# Patient Record
Sex: MALE | Race: WHITE | NOT HISPANIC OR LATINO | URBAN - METROPOLITAN AREA
[De-identification: names, ages, dates, MRNs, and addresses within clinical notes are randomized per-mention and may not be internally consistent; named-entity substitution may affect disease eponyms.]

---

## 2018-10-02 PROBLEM — 38341003 HYPERTENSION: Status: ACTIVE | Noted: 2018-10-02

## 2018-10-02 PROBLEM — 13644009 HYPERCHOLESTEROLEMIA: Status: ACTIVE | Noted: 2018-10-02

## 2018-10-02 PROBLEM — 73430006 SLEEP APNEA: Status: ACTIVE | Noted: 2018-10-02

## 2018-10-26 PROBLEM — 49436004 ATRIAL FIBRILLATION: Status: ACTIVE | Noted: 2018-10-26

## 2018-10-26 PROBLEM — 428283002 HISTORY OF POLYP OF COLON: Status: ACTIVE | Noted: 2018-10-26

## 2018-10-26 PROBLEM — 161701005 HISTORY OF RESPIRATOR DEPENDENCE: Status: ACTIVE | Noted: 2018-10-26

## 2018-10-26 PROBLEM — 609558009 ESSENTIAL TREMOR: Status: ACTIVE | Noted: 2018-10-26

## 2018-10-26 PROBLEM — 95570007 KIDNEY STONE: Status: ACTIVE | Noted: 2018-10-26

## 2021-08-28 ENCOUNTER — TELEPHONE ENCOUNTER (OUTPATIENT)
Dept: URBAN - METROPOLITAN AREA CLINIC 13 | Facility: CLINIC | Age: 66
End: 2021-08-28

## 2021-08-28 RX ORDER — VENLAFAXINE HYDROCHLORIDE 75 MG/1
CAPSULE, EXTENDED RELEASE ORAL
OUTPATIENT
End: 2018-10-26

## 2021-08-29 ENCOUNTER — TELEPHONE ENCOUNTER (OUTPATIENT)
Dept: URBAN - METROPOLITAN AREA CLINIC 13 | Facility: CLINIC | Age: 66
End: 2021-08-29

## 2021-08-29 RX ORDER — TAMSULOSIN HYDROCHLORIDE 0.4 MG/1
CAPSULE ORAL
Status: ACTIVE | COMMUNITY

## 2021-08-29 RX ORDER — PROPAFENONE HYDROCHLORIDE 225 MG/1
TABLET, FILM COATED ORAL
Status: ACTIVE | COMMUNITY

## 2021-08-29 RX ORDER — ESOMEPRAZOLE MAGNESIUM 40 MG/1
FOR SUSPENSION ORAL
Status: ACTIVE | COMMUNITY

## 2021-08-29 RX ORDER — HYDROCHLOROTHIAZIDE 25 MG/1
TABLET ORAL
Status: ACTIVE | COMMUNITY

## 2021-08-29 RX ORDER — SERTRALINE 100 MG/1
TABLET, FILM COATED ORAL
Status: ACTIVE | COMMUNITY

## 2021-08-29 RX ORDER — TIZANIDINE 4 MG/1
TABLET ORAL
Status: ACTIVE | COMMUNITY

## 2021-08-29 RX ORDER — PROPRANOLOL HYDROCHLORIDE 80 MG/1
TABLET ORAL
Status: ACTIVE | COMMUNITY

## 2021-08-29 RX ORDER — APIXABAN 5 MG/1
TABLET, FILM COATED ORAL
Status: ACTIVE | COMMUNITY

## 2023-04-03 ENCOUNTER — LAB OUTSIDE AN ENCOUNTER (OUTPATIENT)
Dept: URBAN - METROPOLITAN AREA CLINIC 19 | Facility: CLINIC | Age: 68
End: 2023-04-03

## 2023-04-03 ENCOUNTER — WEB ENCOUNTER (OUTPATIENT)
Dept: URBAN - METROPOLITAN AREA CLINIC 19 | Facility: CLINIC | Age: 68
End: 2023-04-03

## 2023-04-03 ENCOUNTER — OFFICE VISIT (OUTPATIENT)
Dept: URBAN - METROPOLITAN AREA CLINIC 19 | Facility: CLINIC | Age: 68
End: 2023-04-03
Payer: MEDICARE

## 2023-04-03 VITALS
BODY MASS INDEX: 39.98 KG/M2 | WEIGHT: 295.2 LBS | DIASTOLIC BLOOD PRESSURE: 84 MMHG | HEIGHT: 72 IN | TEMPERATURE: 98.1 F | OXYGEN SATURATION: 94 % | HEART RATE: 71 BPM | SYSTOLIC BLOOD PRESSURE: 145 MMHG

## 2023-04-03 DIAGNOSIS — K92.1 BLACK STOOL: ICD-10-CM

## 2023-04-03 DIAGNOSIS — K59.04 CHRONIC IDIOPATHIC CONSTIPATION: ICD-10-CM

## 2023-04-03 DIAGNOSIS — R10.11 RUQ PAIN: ICD-10-CM

## 2023-04-03 DIAGNOSIS — D50.0 IRON DEFICIENCY ANEMIA DUE TO CHRONIC BLOOD LOSS: ICD-10-CM

## 2023-04-03 PROBLEM — 428283002: Status: ACTIVE | Noted: 2023-04-03

## 2023-04-03 PROBLEM — 724556004: Status: ACTIVE | Noted: 2023-04-03

## 2023-04-03 PROBLEM — 82934008: Status: ACTIVE | Noted: 2023-04-03

## 2023-04-03 PROCEDURE — 99245 OFF/OP CONSLTJ NEW/EST HI 55: CPT | Performed by: NURSE PRACTITIONER

## 2023-04-03 PROCEDURE — 99205 OFFICE O/P NEW HI 60 MIN: CPT | Performed by: NURSE PRACTITIONER

## 2023-04-03 RX ORDER — HYDROCHLOROTHIAZIDE 25 MG/1
TABLET ORAL
Status: ACTIVE | COMMUNITY

## 2023-04-03 RX ORDER — TIZANIDINE 4 MG/1
TABLET ORAL
Status: ACTIVE | COMMUNITY

## 2023-04-03 RX ORDER — TAMSULOSIN HYDROCHLORIDE 0.4 MG/1
CAPSULE ORAL
Status: ACTIVE | COMMUNITY

## 2023-04-03 RX ORDER — LAMOTRIGINE 200 MG/1
1 TABLET TABLET ORAL
Status: ACTIVE | COMMUNITY

## 2023-04-03 RX ORDER — SERTRALINE 100 MG/1
TABLET, FILM COATED ORAL
Status: ACTIVE | COMMUNITY

## 2023-04-03 RX ORDER — PROPRANOLOL HYDROCHLORIDE 80 MG/1
TABLET ORAL
Status: ACTIVE | COMMUNITY

## 2023-04-03 RX ORDER — ESOMEPRAZOLE MAGNESIUM 40 MG/1
FOR SUSPENSION ORAL
Status: ACTIVE | COMMUNITY

## 2023-04-03 RX ORDER — PROPAFENONE HYDROCHLORIDE 225 MG/1
TABLET, FILM COATED ORAL
Status: ACTIVE | COMMUNITY

## 2023-04-03 RX ORDER — BUPROPION HYDROCHLORIDE 200 MG/1
1 TABLET IN THE MORNING TABLET, FILM COATED, EXTENDED RELEASE ORAL
Status: ACTIVE | COMMUNITY

## 2023-04-03 RX ORDER — APIXABAN 5 MG/1
TABLET, FILM COATED ORAL
Status: ACTIVE | COMMUNITY

## 2023-04-03 RX ORDER — ROPINIROLE HYDROCHLORIDE 0.5 MG/1
1 TABLET 1 TO 3 HOURS BEFORE BEDTIME TABLET, FILM COATED ORAL
Status: ACTIVE | COMMUNITY

## 2023-04-03 NOTE — HPI-TODAY'S VISIT:
Mr. Zhou is a 67-year-old male with PMH of HTN, A-fib on Eliquis, COPD, chronic gout, GERD who presents today for CIC and complaints of black stools. Sent upon referral from Dr. Sae Bond. A copy of this report will be sent to the referring provider.  Last colonoscopy was done at [facility] in [facility] on 10/26/2018.  A single sessile 5 mm polyp of benign appearance was found in the sigmoid colon. Two sessile polyps of benign appearance ranging in size from 6 mm to 7 mm were found in the descending colon. Path from biopsies showed tubular adenomas. Recommended repeat colon in 3 years.  He denies having any family history of colon cancer. Both of his sisters have skin cancer. His Father passed from some type of cancer but he does not know what type but knows it was not colon cancer.  He reports ongoing for 2 weeks and everytime he had a BM. Hemoccult done with his PCP was positive. He states over the past week it has lightened up. Reports constipation ever since starting a keto diet 8/2022. He stopped it about a month ago and his weight has gone back up. Still with constipation but he states not as difficult to have a BM. He has tried Metamucil and suppositories with no relief Now having one a day or one every 2-3 days. Reports having a lot of straining, and sitting on toilet for long periods of time.   Reports sometimes at night when laying down and taking a deep breath, he experiences some RUQ pain. Not associated with eating. He reports he has some lesion located between his pancreas and small intestine. He was being seen at [place], was told he may need a Whipple procedure but the lesion did not increase, he was told to return in 2 years but he states he never went back and this was about 5 years ago.  He states he had recent labs done with his PCP and was told he was anemic. He is on a blood thinner. He takes Tylenol twice a day for general aches/pains. He denies taking any NSAIDs. He denies any dysphagia, reflux, nausea or vomiting.

## 2023-04-04 LAB
A/G RATIO: 1.4
ALBUMIN: 3.9
ALKALINE PHOSPHATASE: 101
ALT (SGPT): 9
AST (SGOT): 12
BILIRUBIN, TOTAL: 0.4
BUN/CREATININE RATIO: (no result)
BUN: 18
CALCIUM: 9
CARBON DIOXIDE, TOTAL: 30
CHLORIDE: 102
CREATININE: 1.15
EGFR: 70
FERRITIN, SERUM: 8
GLOBULIN, TOTAL: 2.7
GLUCOSE: 108
HEMATOCRIT: 28
HEMOGLOBIN: 8.8
IRON BIND.CAP.(TIBC): 404
IRON SATURATION: 7
IRON: 29
LIPASE: 26
MCH: 26.4
MCHC: 31.4
MCV: 84.1
MPV: 10.8
PLATELET COUNT: 226
POTASSIUM: 4.1
PROTEIN, TOTAL: 6.6
RDW: 13.4
RED BLOOD CELL COUNT: 3.33
SODIUM: 139
WHITE BLOOD CELL COUNT: 6.2

## 2023-04-05 ENCOUNTER — TELEPHONE ENCOUNTER (OUTPATIENT)
Dept: URBAN - METROPOLITAN AREA CLINIC 35 | Facility: CLINIC | Age: 68
End: 2023-04-05

## 2023-04-12 ENCOUNTER — OFFICE VISIT (OUTPATIENT)
Dept: URBAN - METROPOLITAN AREA CLINIC 18 | Facility: CLINIC | Age: 68
End: 2023-04-12
Payer: MEDICARE

## 2023-04-12 ENCOUNTER — TELEPHONE ENCOUNTER (OUTPATIENT)
Dept: URBAN - METROPOLITAN AREA CLINIC 19 | Facility: CLINIC | Age: 68
End: 2023-04-12

## 2023-04-12 DIAGNOSIS — K76.0 FATTY (CHANGE OF) LIVER: ICD-10-CM

## 2023-04-12 PROCEDURE — 76705 ECHO EXAM OF ABDOMEN: CPT | Performed by: INTERNAL MEDICINE

## 2023-04-12 RX ORDER — PROPAFENONE HYDROCHLORIDE 225 MG/1
TABLET, FILM COATED ORAL
Status: ACTIVE | COMMUNITY

## 2023-04-12 RX ORDER — PROPRANOLOL HYDROCHLORIDE 80 MG/1
TABLET ORAL
Status: ACTIVE | COMMUNITY

## 2023-04-12 RX ORDER — TAMSULOSIN HYDROCHLORIDE 0.4 MG/1
CAPSULE ORAL
Status: ACTIVE | COMMUNITY

## 2023-04-12 RX ORDER — ROPINIROLE HYDROCHLORIDE 0.5 MG/1
1 TABLET 1 TO 3 HOURS BEFORE BEDTIME TABLET, FILM COATED ORAL
Status: ACTIVE | COMMUNITY

## 2023-04-12 RX ORDER — HYDROCHLOROTHIAZIDE 25 MG/1
TABLET ORAL
Status: ACTIVE | COMMUNITY

## 2023-04-12 RX ORDER — ESOMEPRAZOLE MAGNESIUM 40 MG/1
FOR SUSPENSION ORAL
Status: ACTIVE | COMMUNITY

## 2023-04-12 RX ORDER — APIXABAN 5 MG/1
TABLET, FILM COATED ORAL
Status: ACTIVE | COMMUNITY

## 2023-04-12 RX ORDER — TIZANIDINE 4 MG/1
TABLET ORAL
Status: ACTIVE | COMMUNITY

## 2023-04-12 RX ORDER — BUPROPION HYDROCHLORIDE 200 MG/1
1 TABLET IN THE MORNING TABLET, FILM COATED, EXTENDED RELEASE ORAL
Status: ACTIVE | COMMUNITY

## 2023-04-12 RX ORDER — SERTRALINE 100 MG/1
TABLET, FILM COATED ORAL
Status: ACTIVE | COMMUNITY

## 2023-04-12 RX ORDER — LAMOTRIGINE 200 MG/1
1 TABLET TABLET ORAL
Status: ACTIVE | COMMUNITY

## 2023-04-26 ENCOUNTER — TELEPHONE ENCOUNTER (OUTPATIENT)
Dept: URBAN - METROPOLITAN AREA CLINIC 19 | Facility: CLINIC | Age: 68
End: 2023-04-26

## 2023-05-05 ENCOUNTER — TELEPHONE ENCOUNTER (OUTPATIENT)
Dept: URBAN - METROPOLITAN AREA CLINIC 19 | Facility: CLINIC | Age: 68
End: 2023-05-05

## 2023-05-08 ENCOUNTER — OFFICE VISIT (OUTPATIENT)
Dept: URBAN - METROPOLITAN AREA MEDICAL CENTER 25 | Facility: MEDICAL CENTER | Age: 68
End: 2023-05-08
Payer: MEDICARE

## 2023-05-08 DIAGNOSIS — D50.0 1. ANEMIA, IRON DEFICIENCY FROM CHRONIC BLOOD LOSS:: ICD-10-CM

## 2023-05-08 DIAGNOSIS — K31.89 ACQUIRED DEFORMITY OF DUODENUM: ICD-10-CM

## 2023-05-08 DIAGNOSIS — K31.7 BENIGN GASTRIC POLYP: ICD-10-CM

## 2023-05-08 PROCEDURE — 43236 UPPR GI SCOPE W/SUBMUC INJ: CPT | Performed by: INTERNAL MEDICINE

## 2023-05-08 PROCEDURE — 43251 EGD REMOVE LESION SNARE: CPT | Performed by: INTERNAL MEDICINE

## 2023-05-08 PROCEDURE — 43239 EGD BIOPSY SINGLE/MULTIPLE: CPT | Performed by: INTERNAL MEDICINE

## 2023-05-15 ENCOUNTER — TELEPHONE ENCOUNTER (OUTPATIENT)
Dept: URBAN - METROPOLITAN AREA CLINIC 60 | Facility: CLINIC | Age: 68
End: 2023-05-15

## 2023-05-18 ENCOUNTER — TELEPHONE ENCOUNTER (OUTPATIENT)
Dept: URBAN - METROPOLITAN AREA CLINIC 19 | Facility: CLINIC | Age: 68
End: 2023-05-18

## 2023-05-18 RX ORDER — FERROUS SULFATE 325(65) MG
1 TABLET TABLET ORAL ONCE A DAY
Qty: 30 TABLET | Refills: 3 | OUTPATIENT
Start: 2023-05-18

## 2023-05-18 RX ORDER — TAMSULOSIN HYDROCHLORIDE 0.4 MG/1
CAPSULE ORAL
COMMUNITY

## 2023-05-18 RX ORDER — ROPINIROLE HYDROCHLORIDE 0.5 MG/1
1 TABLET 1 TO 3 HOURS BEFORE BEDTIME TABLET, FILM COATED ORAL
COMMUNITY

## 2023-05-18 RX ORDER — PROPRANOLOL HYDROCHLORIDE 80 MG/1
TABLET ORAL
COMMUNITY

## 2023-05-18 RX ORDER — ESOMEPRAZOLE MAGNESIUM 40 MG/1
FOR SUSPENSION ORAL
COMMUNITY

## 2023-05-18 RX ORDER — LAMOTRIGINE 200 MG/1
1 TABLET TABLET ORAL
COMMUNITY

## 2023-05-18 RX ORDER — TIZANIDINE 4 MG/1
TABLET ORAL
COMMUNITY

## 2023-05-18 RX ORDER — SERTRALINE 100 MG/1
TABLET, FILM COATED ORAL
COMMUNITY

## 2023-05-18 RX ORDER — HYDROCHLOROTHIAZIDE 25 MG/1
TABLET ORAL
COMMUNITY

## 2023-05-18 RX ORDER — APIXABAN 5 MG/1
TABLET, FILM COATED ORAL
COMMUNITY

## 2023-05-18 RX ORDER — PROPAFENONE HYDROCHLORIDE 225 MG/1
TABLET, FILM COATED ORAL
COMMUNITY

## 2023-05-18 RX ORDER — BUPROPION HYDROCHLORIDE 200 MG/1
1 TABLET IN THE MORNING TABLET, FILM COATED, EXTENDED RELEASE ORAL
COMMUNITY

## 2023-06-19 ENCOUNTER — TELEPHONE ENCOUNTER (OUTPATIENT)
Dept: URBAN - METROPOLITAN AREA CLINIC 19 | Facility: CLINIC | Age: 68
End: 2023-06-19

## 2023-06-19 ENCOUNTER — OFFICE VISIT (OUTPATIENT)
Dept: URBAN - METROPOLITAN AREA CLINIC 19 | Facility: CLINIC | Age: 68
End: 2023-06-19
Payer: MEDICARE

## 2023-06-19 VITALS
HEART RATE: 99 BPM | HEIGHT: 72 IN | OXYGEN SATURATION: 99 % | SYSTOLIC BLOOD PRESSURE: 112 MMHG | DIASTOLIC BLOOD PRESSURE: 64 MMHG | BODY MASS INDEX: 40.69 KG/M2 | TEMPERATURE: 97.9 F | WEIGHT: 300.4 LBS

## 2023-06-19 DIAGNOSIS — D50.0 IRON DEFICIENCY ANEMIA DUE TO CHRONIC BLOOD LOSS: ICD-10-CM

## 2023-06-19 DIAGNOSIS — K76.0 HEPATIC STEATOSIS: ICD-10-CM

## 2023-06-19 PROCEDURE — 99213 OFFICE O/P EST LOW 20 MIN: CPT | Performed by: NURSE PRACTITIONER

## 2023-06-19 RX ORDER — OMEPRAZOLE 40 MG/1
1 CAPSULE 30 MINUTES BEFORE MORNING MEAL CAPSULE, DELAYED RELEASE ORAL
Status: ACTIVE | COMMUNITY

## 2023-06-19 RX ORDER — CLONAZEPAM 0.5 MG/1
1 TABLET TABLET ORAL
Status: ACTIVE | COMMUNITY

## 2023-06-19 RX ORDER — LAMOTRIGINE 200 MG/1
1 TABLET TABLET ORAL
Status: ACTIVE | COMMUNITY

## 2023-06-19 RX ORDER — TAMSULOSIN HYDROCHLORIDE 0.4 MG/1
CAPSULE ORAL
Status: ACTIVE | COMMUNITY

## 2023-06-19 RX ORDER — RIVAROXABAN 20 MG/1
1 TABLET WITH FOOD TABLET, FILM COATED ORAL ONCE A DAY
Status: ACTIVE | COMMUNITY

## 2023-06-19 RX ORDER — ESOMEPRAZOLE MAGNESIUM 40 MG/1
FOR SUSPENSION ORAL
Status: ACTIVE | COMMUNITY

## 2023-06-19 RX ORDER — PROPAFENONE HYDROCHLORIDE 225 MG/1
TABLET, FILM COATED ORAL
Status: ACTIVE | COMMUNITY

## 2023-06-19 RX ORDER — FERROUS SULFATE 325(65) MG
1 TABLET TABLET ORAL ONCE A DAY
Qty: 30 TABLET | Refills: 3 | Status: ACTIVE | COMMUNITY
Start: 2023-05-18

## 2023-06-19 RX ORDER — PROPRANOLOL HYDROCHLORIDE 80 MG/1
TABLET ORAL
Status: ACTIVE | COMMUNITY

## 2023-06-19 NOTE — HPI-TODAY'S VISIT:
Mr. Zhou is a 67 yo male PMH of HTN, A-fib on Eliquis, COPD, chronic gout, GERD who presents today for follow up. Last seen in clinic by me on 4/3/2023 for chronic constipation and black stools.   EGD done by Dr. Gaemz on 5/8/2023.  3 cm hiatal hernia.  Z-line irregular.  A single gastric polyp with no bleeding or stigmata of bleeding was found in the gastric fundus.  A moderate bulging deformity was found in the ampulla.  Duodenal deformity. Biopsies were taken.    Plan to repeat EGD in 6 months for surveillance.  No GE junction biopsies done at this time will need to be done during next visit.  Path: Ampulla biopsy-benign small intestinal type mucosa showing no significant histopathology.  Gastric fundus polyp-consistent with benign hyperplastic polyp.  Was unable to follow through with colonoscopy as EGD procdure took longer than expected. Plan to do colonoscopy at later date.  Started on oral iron and he is compliant, taking once/day. Tolerating well. Stools are not as black as they used to be. Having a firm BM sometimes daily and sometimes every other day. Still having constipation but states it is somewhat better.            Last colonoscopy was done at [facility] in [facility] on 10/26/2018. A single sessile 5 mm polyp of benign appearance was found in the sigmoid colon. Two sessile polyps of benign appearance ranging in size from 6 mm to 7 mm were found in the descending colon. Path from biopsies showed tubular adenomas. Recommended repeat colon in 3 years.        He denies having any family history of colon cancer. Both of his sisters have skin cancer. His Father passed from some type of cancer but he does not know what type but knows it was not colon cancer.        He reports ongoing for 2 weeks and everytime he had a BM. Hemoccult done with his PCP was positive. He states over the past week it has lightened up. Reports constipation ever since starting a keto diet 8/2022. He stopped it about a month ago and his weight has gone back up. Still with constipation but he states not as difficult to have a BM. He has tried Metamucil and suppositories with no relief        Now having one a day or one every 2-3 days. Reports having a lot of straining, and sitting on toilet for long periods of time.        Reports sometimes at night when laying down and taking a deep breath, he experiences some RUQ pain. Not associated with eating. He reports he has some lesion located between his pancreas and small intestine. He was being seen at [place], was told he may need a Whipple procedure but the lesion did not increase, he was told to return in 2 years but he states he never went back and this was about 5 years ago.        He states he had recent labs done with his PCP and was told he was anemic. He is on a blood thinner. He takes Tylenol twice a day for general aches/pains. He denies taking any NSAIDs. He denies any dysphagia, reflux, nausea or vomiting..

## 2023-06-19 NOTE — PHYSICAL EXAM EYES:
Conjunctivae and eyelids appear normal,  Sclerae : White without injection  alert and awake/follows commands

## 2023-06-20 LAB
FERRITIN, SERUM: 27
HEMATOCRIT: 40
HEMOGLOBIN: 12.7
IRON BIND.CAP.(TIBC): 383
IRON SATURATION: 12
IRON: 46
MCH: 27.1
MCHC: 31.8
MCV: 85.5
MPV: 11.6
PLATELET COUNT: 201
RDW: 18.5
RED BLOOD CELL COUNT: 4.68
WHITE BLOOD CELL COUNT: 6.5

## 2023-06-22 PROBLEM — 197321007: Status: ACTIVE | Noted: 2023-06-22

## 2023-06-29 ENCOUNTER — OFFICE VISIT (OUTPATIENT)
Dept: URBAN - METROPOLITAN AREA CLINIC 19 | Facility: CLINIC | Age: 68
End: 2023-06-29

## 2023-09-21 ENCOUNTER — TELEPHONE ENCOUNTER (OUTPATIENT)
Dept: URBAN - METROPOLITAN AREA CLINIC 19 | Facility: CLINIC | Age: 68
End: 2023-09-21

## 2023-09-21 RX ORDER — FERROUS SULFATE 325(65) MG
1 TABLET TABLET ORAL ONCE A DAY
Qty: 30 TABLET | Refills: 3
Start: 2023-05-18

## 2023-09-24 ENCOUNTER — ERX REFILL RESPONSE (OUTPATIENT)
Dept: URBAN - METROPOLITAN AREA CLINIC 19 | Facility: CLINIC | Age: 68
End: 2023-09-24

## 2023-09-24 RX ORDER — FERROUS SULFATE 325(65) MG
1 TABLET TABLET ORAL ONCE A DAY
Qty: 30 TABLET | Refills: 3 | OUTPATIENT

## 2023-09-24 RX ORDER — FERROUS SULFATE TAB 325 MG (65 MG ELEMENTAL FE) 325 (65 FE) MG
TAKE ONE TABLET BY MOUTH ONE TIME DAILY TAB ORAL
Qty: 30 TABLET | Refills: 2 | OUTPATIENT

## 2023-10-04 ENCOUNTER — OFFICE VISIT (OUTPATIENT)
Dept: URBAN - METROPOLITAN AREA CLINIC 19 | Facility: CLINIC | Age: 68
End: 2023-10-04
Payer: MEDICARE

## 2023-10-04 ENCOUNTER — LAB OUTSIDE AN ENCOUNTER (OUTPATIENT)
Dept: URBAN - METROPOLITAN AREA CLINIC 19 | Facility: CLINIC | Age: 68
End: 2023-10-04

## 2023-10-04 VITALS
TEMPERATURE: 97.2 F | SYSTOLIC BLOOD PRESSURE: 124 MMHG | HEART RATE: 77 BPM | OXYGEN SATURATION: 98 % | BODY MASS INDEX: 42.66 KG/M2 | DIASTOLIC BLOOD PRESSURE: 72 MMHG | WEIGHT: 315 LBS | HEIGHT: 72 IN

## 2023-10-04 DIAGNOSIS — K92.1 BLACK STOOL: ICD-10-CM

## 2023-10-04 DIAGNOSIS — D50.0 IRON DEFICIENCY ANEMIA DUE TO CHRONIC BLOOD LOSS: ICD-10-CM

## 2023-10-04 DIAGNOSIS — Z86.010 PERSONAL HISTORY OF COLONIC POLYPS: ICD-10-CM

## 2023-10-04 PROCEDURE — 99214 OFFICE O/P EST MOD 30 MIN: CPT | Performed by: NURSE PRACTITIONER

## 2023-10-04 RX ORDER — CLONAZEPAM 0.5 MG/1
1 TABLET TABLET ORAL
Status: ACTIVE | COMMUNITY

## 2023-10-04 RX ORDER — ESOMEPRAZOLE MAGNESIUM 40 MG/1
FOR SUSPENSION ORAL
Status: ACTIVE | COMMUNITY

## 2023-10-04 RX ORDER — RIVAROXABAN 20 MG/1
1 TABLET WITH FOOD TABLET, FILM COATED ORAL ONCE A DAY
Status: ACTIVE | COMMUNITY

## 2023-10-04 RX ORDER — OMEPRAZOLE 40 MG/1
1 CAPSULE 30 MINUTES BEFORE MORNING MEAL CAPSULE, DELAYED RELEASE ORAL
Status: ACTIVE | COMMUNITY

## 2023-10-04 RX ORDER — PROPAFENONE HYDROCHLORIDE 225 MG/1
TABLET, FILM COATED ORAL
Status: ACTIVE | COMMUNITY

## 2023-10-04 RX ORDER — FERROUS SULFATE TAB 325 MG (65 MG ELEMENTAL FE) 325 (65 FE) MG
TAKE ONE TABLET BY MOUTH ONE TIME DAILY TAB ORAL
Qty: 30 TABLET | Refills: 2 | Status: ACTIVE | COMMUNITY

## 2023-10-04 RX ORDER — TAMSULOSIN HYDROCHLORIDE 0.4 MG/1
CAPSULE ORAL
Status: ACTIVE | COMMUNITY

## 2023-10-04 RX ORDER — PROPRANOLOL HYDROCHLORIDE 80 MG/1
TABLET ORAL
Status: ACTIVE | COMMUNITY

## 2023-10-04 RX ORDER — LAMOTRIGINE 200 MG/1
1 TABLET TABLET ORAL
Status: ACTIVE | COMMUNITY

## 2023-10-05 LAB
FERRITIN, SERUM: 53
HEMATOCRIT: 42.7
HEMOGLOBIN: 14
IRON BIND.CAP.(TIBC): 353
IRON SATURATION: 38
IRON: 134
MCH: 30.4
MCHC: 32.8
MCV: 92.6
MPV: 11.6
PLATELET COUNT: 140
RDW: 13.3
RED BLOOD CELL COUNT: 4.61
WHITE BLOOD CELL COUNT: 6.4

## 2023-10-12 ENCOUNTER — TELEPHONE ENCOUNTER (OUTPATIENT)
Dept: URBAN - METROPOLITAN AREA CLINIC 19 | Facility: CLINIC | Age: 68
End: 2023-10-12

## 2023-10-18 ENCOUNTER — TELEPHONE ENCOUNTER (OUTPATIENT)
Dept: URBAN - METROPOLITAN AREA CLINIC 44 | Facility: CLINIC | Age: 68
End: 2023-10-18

## 2023-11-02 ENCOUNTER — OFFICE VISIT (OUTPATIENT)
Dept: URBAN - METROPOLITAN AREA MEDICAL CENTER 25 | Facility: MEDICAL CENTER | Age: 68
End: 2023-11-02
Payer: MEDICARE

## 2023-11-02 DIAGNOSIS — K22.89 DILATATION OF ESOPHAGUS: ICD-10-CM

## 2023-11-02 DIAGNOSIS — K29.60 ADENOPAPILLOMATOSIS GASTRICA: ICD-10-CM

## 2023-11-02 DIAGNOSIS — D12.4 ADENOMA OF DESCENDING COLON: ICD-10-CM

## 2023-11-02 DIAGNOSIS — D12.2 ADENOMA OF ASCENDING COLON: ICD-10-CM

## 2023-11-02 DIAGNOSIS — K62.1 ANAL AND RECTAL POLYP: ICD-10-CM

## 2023-11-02 DIAGNOSIS — D12.3 ADENOMA OF TRANSVERSE COLON: ICD-10-CM

## 2023-11-02 DIAGNOSIS — D50.9 ANEMIA: ICD-10-CM

## 2023-11-02 PROCEDURE — 43239 EGD BIOPSY SINGLE/MULTIPLE: CPT | Performed by: INTERNAL MEDICINE

## 2023-11-02 PROCEDURE — 45380 COLONOSCOPY AND BIOPSY: CPT | Performed by: INTERNAL MEDICINE

## 2024-01-19 ENCOUNTER — DASHBOARD ENCOUNTERS (OUTPATIENT)
Age: 69
End: 2024-01-19

## 2024-01-19 ENCOUNTER — LAB OUTSIDE AN ENCOUNTER (OUTPATIENT)
Dept: URBAN - METROPOLITAN AREA CLINIC 19 | Facility: CLINIC | Age: 69
End: 2024-01-19

## 2024-01-19 ENCOUNTER — OFFICE VISIT (OUTPATIENT)
Dept: URBAN - METROPOLITAN AREA CLINIC 19 | Facility: CLINIC | Age: 69
End: 2024-01-19
Payer: MEDICARE

## 2024-01-19 VITALS
WEIGHT: 315 LBS | SYSTOLIC BLOOD PRESSURE: 130 MMHG | BODY MASS INDEX: 42.66 KG/M2 | HEART RATE: 91 BPM | HEIGHT: 72 IN | TEMPERATURE: 97 F | DIASTOLIC BLOOD PRESSURE: 82 MMHG

## 2024-01-19 DIAGNOSIS — D50.8 ACHLORHYDRIC ANEMIA: ICD-10-CM

## 2024-01-19 PROCEDURE — 99214 OFFICE O/P EST MOD 30 MIN: CPT | Performed by: NURSE PRACTITIONER

## 2024-01-19 RX ORDER — CLONAZEPAM 0.5 MG/1
1 TABLET TABLET ORAL
Status: ACTIVE | COMMUNITY

## 2024-01-19 RX ORDER — RIVAROXABAN 20 MG/1
1 TABLET WITH FOOD TABLET, FILM COATED ORAL ONCE A DAY
Status: ACTIVE | COMMUNITY

## 2024-01-19 RX ORDER — ESOMEPRAZOLE MAGNESIUM 40 MG/1
FOR SUSPENSION ORAL
Status: ACTIVE | COMMUNITY

## 2024-01-19 RX ORDER — PROPAFENONE HYDROCHLORIDE 225 MG/1
TABLET, FILM COATED ORAL
Status: ACTIVE | COMMUNITY

## 2024-01-19 RX ORDER — PROPRANOLOL HYDROCHLORIDE 80 MG/1
TABLET ORAL
Status: ACTIVE | COMMUNITY

## 2024-01-19 RX ORDER — TAMSULOSIN HYDROCHLORIDE 0.4 MG/1
CAPSULE ORAL
Status: ACTIVE | COMMUNITY

## 2024-01-19 RX ORDER — LAMOTRIGINE 200 MG/1
1 TABLET TABLET ORAL
Status: ACTIVE | COMMUNITY

## 2024-01-19 RX ORDER — OMEPRAZOLE 40 MG/1
1 CAPSULE 30 MINUTES BEFORE MORNING MEAL CAPSULE, DELAYED RELEASE ORAL
Status: ACTIVE | COMMUNITY

## 2024-01-19 RX ORDER — FERROUS SULFATE TAB 325 MG (65 MG ELEMENTAL FE) 325 (65 FE) MG
TAKE ONE TABLET BY MOUTH ONE TIME DAILY TAB ORAL
Qty: 30 TABLET | Refills: 2 | Status: ACTIVE | COMMUNITY

## 2024-01-19 NOTE — PHYSICAL EXAM HENT:
Head, normocephalic, atraumatic, Face, Face within normal limits, Ears, External ears within normal limits, Nose/Nasopharynx, External nose normal appearance, nares patent, no nasal discharge, Mouth and Throat, Oral cavity appearance normal, Lips, Appearance normal Post-Care Instructions: I reviewed with the patient in detail post-care instructions. Patient is to keep the biopsy site dry overnight, and then apply vasoline twice daily until healed. Patient may apply diluted hydrogen peroxide soaks to remove any crusting.

## 2024-01-19 NOTE — HPI-TODAY'S VISIT:
Mr. Zhou is a 69 yo male PMH of HTN, A-fib on Xarelto, COPD, essential tremors with deep brain stimulator, chronic gout, hepatic steatosis, GERD who presents today for follow up of JOVANNY, black stools.  Labs 4/3/2023:Hgb 8.8, iron 29, 7% iron sat, , ferritin 8  Labs 6/19/2023: Hg of 12.7, iron 46, 12% iron sat, , ferritin 27  Last seen in GI clinic 10/4/2023 Labs at that time: CBC normal with Hg of 14, iron studies normal, ferritin was normal at 53 Iron 134, 38% iron sat, , ferritin 53.  EGD/colonoscopy was performed by Dr. Gamez on 11/2/2023 EGD-Z-line irregular, chronic erosive gastritis, a single lesion was found in the duodenum. Path: Ampulla biopsy-focal foveolar metaplasia no specific histopathologic abnormality.  Random gastric biopsy-chronic inactive gastritis with focal regenerative type changes negative for H. pylori or intestinal metaplasia.  GE junction biopsy inflammation Colonoscopy-TI normal, 4 to 6 mm polyps four (tubular adenomas).  3-year follow-up given.   He reports being newly diagnosed with kidney cancer, plans for possible surgery in May Continues on ferrous sulfate daily - wants to know if he should continue or not.  Occasional black stools, denies blood in stool.              -------------------------        Prior Hx:        Seen in clinic by me on 4/3/2023 for chronic constipation and black stools.         - EGD done by Dr. Gamez on 5/8/2023. 3 cm hiatal hernia. Z-line irregular. A single gastric polyp with no bleeding or stigmata of bleeding was found in the gastric fundus. A moderate bulging deformity was found in the ampulla. Duodenal deformity. Biopsies were taken.         - Path: Ampulla biopsy-benign small intestinal type mucosa showing no significant histopathology. Gastric fundus polyp-consistent with benign hyperplastic polyp.         - Was unable to follow through with colonoscopy as EGD procdure took longer than expected. Plan to do colonoscopy at later date.         - Plan to repeat EGD in 6 months for surveillance. No GE junction biopsies done at this time will need to be done during next visit.         - Started on oral iron and he is compliant, taking once/day. Tolerating well. Stools are not as black as they used to be. Having a solid BM daily.         - Last colonoscopy was done at [facility] in [facility] on 10/26/2018. A single sessile 5 mm polyp of benign appearance was found in the sigmoid colon. Two sessile polyps of benign appearance ranging in size from 6 mm to 7 mm were found in the descending colon. Path from biopsies showed tubular adenomas. Recommended repeat colon in 3 years.         - He denies having any family history of colon cancer. Both of his sisters have skin cancer. His Father passed from some type of cancer but he does not know what type but knows it was not colon cancer.         - Reported occasionally when laying down at night and taking a deep breath, he experiences some RUQ pain. Not associated with eating. He reports he has some lesion located between his pancreas and small intestine. He was being seen at [place], was told he may need a Whipple procedure but the lesion did not increase, he was told to return in 2 years but he states he never went back and this was about 5 years ago..

## 2024-01-20 LAB
FERRITIN, SERUM: 36
FOLATE (FOLIC ACID), SERUM: 12.9
HEMATOCRIT: 40.8
HEMOGLOBIN: 13.4
IRON BIND.CAP.(TIBC): 311
IRON SATURATION: 28
IRON: 88
MCH: 30.4
MCHC: 32.8
MCV: 92.5
MPV: 11.7
PLATELET COUNT: 136
RDW: 12.3
RED BLOOD CELL COUNT: 4.41
VITAMIN B12: 464
WHITE BLOOD CELL COUNT: 5.8

## 2024-02-02 ENCOUNTER — PILLCAM (OUTPATIENT)
Dept: URBAN - METROPOLITAN AREA CLINIC 18 | Facility: CLINIC | Age: 69
End: 2024-02-02
Payer: MEDICARE

## 2024-02-02 DIAGNOSIS — D50.9 ANEMIA: ICD-10-CM

## 2024-02-02 PROCEDURE — 91110 GI TRC IMG INTRAL ESOPH-ILE: CPT | Performed by: INTERNAL MEDICINE

## 2024-02-02 RX ORDER — CLONAZEPAM 0.5 MG/1
1 TABLET TABLET ORAL
Status: ACTIVE | COMMUNITY

## 2024-02-02 RX ORDER — RIVAROXABAN 20 MG/1
1 TABLET WITH FOOD TABLET, FILM COATED ORAL ONCE A DAY
Status: ACTIVE | COMMUNITY

## 2024-02-02 RX ORDER — PROPAFENONE HYDROCHLORIDE 225 MG/1
TABLET, FILM COATED ORAL
Status: ACTIVE | COMMUNITY

## 2024-02-02 RX ORDER — TAMSULOSIN HYDROCHLORIDE 0.4 MG/1
CAPSULE ORAL
Status: ACTIVE | COMMUNITY

## 2024-02-02 RX ORDER — ESOMEPRAZOLE MAGNESIUM 40 MG/1
FOR SUSPENSION ORAL
Status: ACTIVE | COMMUNITY

## 2024-02-02 RX ORDER — PROPRANOLOL HYDROCHLORIDE 80 MG/1
TABLET ORAL
Status: ACTIVE | COMMUNITY

## 2024-02-02 RX ORDER — FERROUS SULFATE TAB 325 MG (65 MG ELEMENTAL FE) 325 (65 FE) MG
TAKE ONE TABLET BY MOUTH ONE TIME DAILY TAB ORAL
Qty: 30 TABLET | Refills: 2 | Status: ACTIVE | COMMUNITY

## 2024-02-02 RX ORDER — LAMOTRIGINE 200 MG/1
1 TABLET TABLET ORAL
Status: ACTIVE | COMMUNITY

## 2024-02-02 RX ORDER — OMEPRAZOLE 40 MG/1
1 CAPSULE 30 MINUTES BEFORE MORNING MEAL CAPSULE, DELAYED RELEASE ORAL
Status: ACTIVE | COMMUNITY

## 2024-08-30 ENCOUNTER — OFFICE VISIT (OUTPATIENT)
Dept: URBAN - METROPOLITAN AREA CLINIC 19 | Facility: CLINIC | Age: 69
End: 2024-08-30
Payer: MEDICARE

## 2024-08-30 VITALS
HEART RATE: 76 BPM | TEMPERATURE: 98.6 F | SYSTOLIC BLOOD PRESSURE: 120 MMHG | BODY MASS INDEX: 42.66 KG/M2 | HEIGHT: 72 IN | WEIGHT: 315 LBS | DIASTOLIC BLOOD PRESSURE: 70 MMHG

## 2024-08-30 DIAGNOSIS — K59.04 CHRONIC IDIOPATHIC CONSTIPATION: ICD-10-CM

## 2024-08-30 DIAGNOSIS — D50.0 IRON DEFICIENCY ANEMIA DUE TO CHRONIC BLOOD LOSS: ICD-10-CM

## 2024-08-30 PROCEDURE — 99214 OFFICE O/P EST MOD 30 MIN: CPT | Performed by: NURSE PRACTITIONER

## 2024-08-30 RX ORDER — PROPRANOLOL HYDROCHLORIDE 80 MG/1
TABLET ORAL
Status: ACTIVE | COMMUNITY

## 2024-08-30 RX ORDER — RIVAROXABAN 20 MG/1
1 TABLET WITH FOOD TABLET, FILM COATED ORAL ONCE A DAY
Status: DISCONTINUED | COMMUNITY

## 2024-08-30 RX ORDER — LAMOTRIGINE 200 MG/1
1 TABLET TABLET ORAL
Status: ACTIVE | COMMUNITY

## 2024-08-30 RX ORDER — OMEPRAZOLE 40 MG/1
1 CAPSULE 30 MINUTES BEFORE MORNING MEAL CAPSULE, DELAYED RELEASE ORAL
Status: ACTIVE | COMMUNITY

## 2024-08-30 RX ORDER — CLONAZEPAM 0.5 MG/1
1 TABLET TABLET ORAL
Status: ACTIVE | COMMUNITY

## 2024-08-30 RX ORDER — FERROUS SULFATE TAB 325 MG (65 MG ELEMENTAL FE) 325 (65 FE) MG
TAKE ONE TABLET BY MOUTH ONE TIME DAILY TAB ORAL
Qty: 30 TABLET | Refills: 11 | Status: DISCONTINUED | COMMUNITY

## 2024-08-30 RX ORDER — PROPAFENONE HYDROCHLORIDE 225 MG/1
TABLET, FILM COATED ORAL
Status: DISCONTINUED | COMMUNITY

## 2024-08-30 RX ORDER — ESOMEPRAZOLE MAGNESIUM 40 MG/1
FOR SUSPENSION ORAL
Status: DISCONTINUED | COMMUNITY

## 2024-08-30 RX ORDER — TAMSULOSIN HYDROCHLORIDE 0.4 MG/1
CAPSULE ORAL
Status: ACTIVE | COMMUNITY

## 2024-08-30 NOTE — HPI-TODAY'S VISIT:
Mr. Zhou is a 68 yo male PMH of HTN, A-fib on Xarelto, COPD, essential tremors with deep brain stimulator, chronic gout, hepatic steatosis, GERD.  Originially presented with JOVANNY and black stools.  Labs 4/3/2023:Hgb 8.8, iron 29, 7% iron sat, , ferritin 8  Labs 6/19/2023: Hg of 12.7, iron 46, 12% iron sat, , ferritin 27  Last seen in GI clinic 10/4/2023 Labs at that time: CBC normal with Hg of 14, iron studies normal, ferritin was normal at 53 Iron 134, 38% iron sat, , ferritin 53.  EGD/colonoscopy was performed by Dr. Gamez on 11/2/2023 EGD-Z-line irregular, chronic erosive gastritis, a single lesion was found in the duodenum. Path: Ampulla biopsy-focal foveolar metaplasia no specific histopathologic abnormality. Random gastric biopsy-chronic inactive gastritis with focal regenerative type changes negative for H. pylori or intestinal metaplasia. GE junction biopsy inflammation Colonoscopy-TI normal, 4 to 6 mm polyps four (tubular adenomas). 3-year follow-up given.  He was last seen 1/19/2020 for an labs that day WBC 5.8, Hgb 13.4, MCV 92.5,  vitamin B12 and folate were normal, iron studies were normal with ferritin of 36 2/2/2024 PillCam was normal  He tells me 2/26/2024 he had another deep brain stim placed for his essential tremors. He is here now c/o constipation, onset about 1-2 years ago. Gotten so bad, he vomits at times because he strains so hard. Takes stool softener daily, lately having to take a suppository. He is no longer on oral iron. He states over a month ago, he had rectal bleeding that lasted 4 days, some dark and some light bright red. Can see some blood on tissue when wiping about once a week now. He reports he was diagnosed with kidney cancer and had part of his right kidney removed. Has a 6 mo CT followup next month.     - - - - - - - - - - - - - Prior Hx: Seen in clinic by me on 4/3/2023 for chronic constipation and black stools. - EGD done by Dr. Gamez on 5/8/2023. 3 cm hiatal hernia. Z-line irregular. A single gastric polyp with no bleeding or stigmata of bleeding was found in the gastric fundus. A moderate bulging deformity was found in the ampulla. Duodenal deformity. Biopsies were taken. - Path: Ampulla biopsy-benign small intestinal type mucosa showing no significant histopathology. Gastric fundus polyp-consistent with benign hyperplastic polyp. - Was unable to follow through with colonoscopy as EGD procdure took longer than expected. Plan to do colonoscopy at later date. - Plan to repeat EGD in 6 months for surveillance. No GE junction biopsies done at this time will need to be done during next visit. - Started on oral iron and he is compliant, taking once/day. Tolerating well. Stools are not as black as they used to be. Having a solid BM daily. - Last colonoscopy was done at [facility] in [facility] on 10/26/2018. A single sessile 5 mm polyp of benign appearance was found in the sigmoid colon. Two sessile polyps of benign appearance ranging in size from 6 mm to 7 mm were found in the descending colon. Path from biopsies showed tubular adenomas. Recommended repeat colon in 3 years. - He denies having any family history of colon cancer. Both of his sisters have skin cancer. His Father passed from some type of cancer but he does not know what type but knows it was not colon cancer. - Reported occasionally when laying down at night and taking a deep breath, he experiences some RUQ pain. Not associated with eating. He reports he has some lesion located between his pancreas and small intestine. He was being seen at [place], was told he may need a Whipple procedure but the lesion did not increase, he was told to return in 2 years but he states he never went back and this was about 5 years ago.

## 2024-08-31 LAB
FERRITIN, SERUM: 81
HEMATOCRIT: 42.1
HEMOGLOBIN: 13.9
IRON BIND.CAP.(TIBC): 296
IRON SATURATION: 43
IRON: 128
MCH: 31.4
MCHC: 33
MCV: 95.2
MPV: 11.6
PLATELET COUNT: 145
RDW: 12.6
RED BLOOD CELL COUNT: 4.42
WHITE BLOOD CELL COUNT: 5.9

## 2024-09-16 ENCOUNTER — OFFICE VISIT (OUTPATIENT)
Dept: URBAN - METROPOLITAN AREA CLINIC 19 | Facility: CLINIC | Age: 69
End: 2024-09-16
Payer: MEDICARE

## 2024-09-16 VITALS
WEIGHT: 315 LBS | BODY MASS INDEX: 42.66 KG/M2 | TEMPERATURE: 97.3 F | HEART RATE: 76 BPM | DIASTOLIC BLOOD PRESSURE: 74 MMHG | HEIGHT: 72 IN | SYSTOLIC BLOOD PRESSURE: 130 MMHG

## 2024-09-16 DIAGNOSIS — K59.04 CHRONIC IDIOPATHIC CONSTIPATION: ICD-10-CM

## 2024-09-16 PROCEDURE — 99212 OFFICE O/P EST SF 10 MIN: CPT | Performed by: NURSE PRACTITIONER

## 2024-09-16 RX ORDER — OMEPRAZOLE 40 MG/1
1 CAPSULE 30 MINUTES BEFORE MORNING MEAL CAPSULE, DELAYED RELEASE ORAL
Status: ACTIVE | COMMUNITY

## 2024-09-16 RX ORDER — PROPRANOLOL HYDROCHLORIDE 80 MG/1
TABLET ORAL
Status: ACTIVE | COMMUNITY

## 2024-09-16 RX ORDER — TAMSULOSIN HYDROCHLORIDE 0.4 MG/1
CAPSULE ORAL
Status: ACTIVE | COMMUNITY

## 2024-09-16 RX ORDER — LAMOTRIGINE 200 MG/1
1 TABLET TABLET ORAL
Status: ACTIVE | COMMUNITY

## 2024-09-16 RX ORDER — CLONAZEPAM 0.5 MG/1
1 TABLET TABLET ORAL
Status: ACTIVE | COMMUNITY

## 2024-09-16 NOTE — HPI-TODAY'S VISIT:
Mr. Zhou is a 70 yo male PMH of HTN, A-fib on Xarelto, COPD, essential tremors with deep brain stimulator, chronic gout, hepatic steatosis, GERD, kidney cancer.  Here for f/u of constipation.  8/30/2024 labs with normal hemoglobin 13.9, ferritin 81, iron 128, 43% iron sat Linzess 72 trial given but he did not start it yet because he says he has been taking 1 cap of miralax daily which has been giving him a good BM daily.  Not on any oral iron.     - - - - - - - - - - - - - Prior Hx: Seen in clinic by me on 4/3/2023 for chronic constipation and black stools. - EGD done by Dr. Gamez on 5/8/2023. 3 cm hiatal hernia. Z-line irregular. A single gastric polyp with no bleeding or stigmata of bleeding was found in the gastric fundus. A moderate bulging deformity was found in the ampulla. Duodenal deformity. Biopsies were taken. - Path: Ampulla biopsy-benign small intestinal type mucosa showing no significant histopathology. Gastric fundus polyp-consistent with benign hyperplastic polyp. - Was unable to follow through with colonoscopy as EGD procdure took longer than expected. Plan to do colonoscopy at later date. - Plan to repeat EGD in 6 months for surveillance. No GE junction biopsies done at this time will need to be done during next visit. - Started on oral iron and he is compliant, taking once/day. Tolerating well. Stools are not as black as they used to be. Having a solid BM daily. - Last colonoscopy was done at [facility] in [facility] on 10/26/2018. A single sessile 5 mm polyp of benign appearance was found in the sigmoid colon. Two sessile polyps of benign appearance ranging in size from 6 mm to 7 mm were found in the descending colon. Path from biopsies showed tubular adenomas. Recommended repeat colon in 3 years. - He denies having any family history of colon cancer. Both of his sisters have skin cancer. His Father passed from some type of cancer but he does not know what type but knows it was not colon cancer. - Reported occasionally when laying down at night and taking a deep breath, he experiences some RUQ pain. Not associated with eating. He reports he has some lesion located between his pancreas and small intestine. He was being seen at [place], was told he may need a Whipple procedure but the lesion did not increase, he was told to return in 2 years but he states he never went back and this was about 5 years ago. Originially presented with JOVANNY and black stools.  Labs 4/3/2023:Hgb 8.8, iron 29, 7% iron sat, , ferritin 8  Labs 6/19/2023: Hg of 12.7, iron 46, 12% iron sat, , ferritin 27  Seen in GI clinic 10/4/2023 Labs at that time: CBC normal with Hg of 14, iron studies normal, ferritin was normal at 53 Iron 134, 38% iron sat, , ferritin 53.  EGD/colonoscopy was performed by Dr. Gamez on 11/2/2023 EGD-Z-line irregular, chronic erosive gastritis, a single lesion was found in the duodenum. Path: Ampulla biopsy-focal foveolar metaplasia no specific histopathologic abnormality. Random gastric biopsy-chronic inactive gastritis with focal regenerative type changes negative for H. pylori or intestinal metaplasia. GE junction biopsy inflammation Colonoscopy-TI normal, 4 to 6 mm polyps four (tubular adenomas). 3-year follow-up given.  1/19/2024 labs WBC 5.8, Hgb 13.4, MCV 92.5,  vitamin B12 and folate were normal, iron studies were normal with ferritin of 36 2/2/2024 PillCam was normal.

## 2024-10-23 ENCOUNTER — TELEPHONE ENCOUNTER (OUTPATIENT)
Dept: URBAN - METROPOLITAN AREA CLINIC 19 | Facility: CLINIC | Age: 69
End: 2024-10-23

## 2025-03-17 ENCOUNTER — OFFICE VISIT (OUTPATIENT)
Dept: URBAN - METROPOLITAN AREA CLINIC 19 | Facility: CLINIC | Age: 70
End: 2025-03-17